# Patient Record
Sex: FEMALE | Race: WHITE | Employment: FULL TIME | URBAN - METROPOLITAN AREA
[De-identification: names, ages, dates, MRNs, and addresses within clinical notes are randomized per-mention and may not be internally consistent; named-entity substitution may affect disease eponyms.]

---

## 2017-09-25 PROBLEM — K59.01 SLOW TRANSIT CONSTIPATION: Status: ACTIVE | Noted: 2017-09-25

## 2018-04-23 DIAGNOSIS — K59.01 SLOW TRANSIT CONSTIPATION: ICD-10-CM

## 2018-04-23 DIAGNOSIS — R19.5 STRONG ODOR OF STOOLS: ICD-10-CM

## 2019-08-27 ENCOUNTER — HOSPITAL ENCOUNTER (OUTPATIENT)
Age: 54
Discharge: HOME | End: 2019-08-29
Payer: COMMERCIAL

## 2019-08-27 PROCEDURE — 87088 URINE BACTERIA CULTURE: CPT

## 2019-08-29 LAB — URINE CULTURE, ROUTINE: NORMAL

## 2020-06-01 ENCOUNTER — HOSPITAL ENCOUNTER (OUTPATIENT)
Age: 55
Discharge: HOME | End: 2020-06-03
Payer: COMMERCIAL

## 2020-06-01 PROCEDURE — G0123 SCREEN CERV/VAG THIN LAYER: HCPCS

## 2020-06-02 PROCEDURE — 87624 HPV HI-RISK TYP POOLED RSLT: CPT

## 2020-06-06 LAB
HPV SAMPLE: NORMAL
HPV TYPE 16: NOT DETECTED
HPV TYPE 18: NOT DETECTED
HPV, HIGH RISK OTHER: NOT DETECTED
INTERPRETATION: NORMAL
SOURCE: NORMAL

## 2020-09-02 ENCOUNTER — HOSPITAL ENCOUNTER (OUTPATIENT)
Age: 55
Discharge: HOME | End: 2020-09-04
Payer: COMMERCIAL

## 2020-09-02 LAB
AMORPHOUS: ABNORMAL
BACTERIA: ABNORMAL /HPF
BILIRUBIN URINE: NEGATIVE
BLOOD, URINE: NEGATIVE
CLARITY: CLEAR
COLOR: YELLOW
GLUCOSE URINE: NEGATIVE MG/DL
KETONES, URINE: NEGATIVE MG/DL
LEUKOCYTE ESTERASE, URINE: ABNORMAL
NITRITE, URINE: NEGATIVE
PH UA: 7.5 (ref 5–9)
PROTEIN UA: NEGATIVE MG/DL
RBC UA: ABNORMAL /HPF (ref 0–2)
SPECIFIC GRAVITY UA: 1.01 (ref 1–1.03)
UROBILINOGEN, URINE: 0.2 E.U./DL
WBC UA: ABNORMAL /HPF (ref 0–5)

## 2020-09-02 PROCEDURE — 87088 URINE BACTERIA CULTURE: CPT

## 2020-09-02 PROCEDURE — 81001 URINALYSIS AUTO W/SCOPE: CPT

## 2020-09-04 LAB — URINE CULTURE, ROUTINE: NORMAL

## 2021-03-21 ENCOUNTER — IMMUNIZATION (OUTPATIENT)
Dept: PRIMARY CARE CLINIC | Age: 56
End: 2021-03-21
Payer: COMMERCIAL

## 2021-03-21 PROCEDURE — 91300 COVID-19, PFIZER VACCINE 30MCG/0.3ML DOSE: CPT | Performed by: INTERNAL MEDICINE

## 2021-03-21 PROCEDURE — 0001A PR IMM ADMN SARSCOV2 30MCG/0.3ML DIL RECON 1ST DOSE: CPT | Performed by: INTERNAL MEDICINE

## 2021-04-21 ENCOUNTER — IMMUNIZATION (OUTPATIENT)
Dept: PRIMARY CARE CLINIC | Age: 56
End: 2021-04-21
Payer: COMMERCIAL

## 2021-04-21 PROCEDURE — 0002A COVID-19, PFIZER VACCINE 30MCG/0.3ML DOSE: CPT | Performed by: NURSE PRACTITIONER

## 2021-04-21 PROCEDURE — 91300 COVID-19, PFIZER VACCINE 30MCG/0.3ML DOSE: CPT | Performed by: NURSE PRACTITIONER

## 2023-02-16 ENCOUNTER — OFFICE VISIT (OUTPATIENT)
Dept: ENDOCRINOLOGY | Age: 58
End: 2023-02-16
Payer: COMMERCIAL

## 2023-02-16 VITALS
DIASTOLIC BLOOD PRESSURE: 90 MMHG | HEART RATE: 79 BPM | SYSTOLIC BLOOD PRESSURE: 157 MMHG | WEIGHT: 135 LBS | OXYGEN SATURATION: 99 % | BODY MASS INDEX: 24.84 KG/M2 | HEIGHT: 62 IN

## 2023-02-16 DIAGNOSIS — E04.9 GOITER: Primary | ICD-10-CM

## 2023-02-16 PROCEDURE — 99204 OFFICE O/P NEW MOD 45 MIN: CPT | Performed by: CLINICAL NURSE SPECIALIST

## 2023-02-16 PROCEDURE — G8427 DOCREV CUR MEDS BY ELIG CLIN: HCPCS | Performed by: CLINICAL NURSE SPECIALIST

## 2023-02-16 PROCEDURE — G8420 CALC BMI NORM PARAMETERS: HCPCS | Performed by: CLINICAL NURSE SPECIALIST

## 2023-02-16 PROCEDURE — 3017F COLORECTAL CA SCREEN DOC REV: CPT | Performed by: CLINICAL NURSE SPECIALIST

## 2023-02-16 PROCEDURE — 4004F PT TOBACCO SCREEN RCVD TLK: CPT | Performed by: CLINICAL NURSE SPECIALIST

## 2023-02-16 PROCEDURE — G8484 FLU IMMUNIZE NO ADMIN: HCPCS | Performed by: CLINICAL NURSE SPECIALIST

## 2023-02-16 NOTE — PROGRESS NOTES
[address] [place] [address] Department of Endocrinology Diabetes and Metabolism   [address] [facility] [address]   Phone: [phone]  Fax: [phone]    Date of Service: 2/16/2023    Primary Care Physician: Judd Gamino DO  Referring physician: [place]*  Provider: Anitha Merchant APRN - CNS     Reason for the visit:  Goiter      History of Present Illness: The history is provided by the patient. No  was used. Accuracy of the patient data is excellent. Daniel Bertrand is a very pleasant 62 y.o. female seen today for goiter    First diagnosed: 9/2022  Context: Pt was having weight gain, fatigue and enlarged thyroid on palpiation  Symptoms: Decreased muscle tone   Imaging: (10/22) Thyroid ultrasound showed right lobe measuring 4.4 x 1.0 x 1.6 cm and left lobe measuring 3.8 x 1.0 x 1.7 cm, isthmus unremarkable. No nodules identified  Labs: 9/2022 TSH 2.1, T4 5.5  Denies compressive symptoms   Mother had hypothyroidism      PAST MEDICAL HISTORY   Past Medical History:   Diagnosis Date    Anxiety     Chronic cystitis     Headache     Urinary incontinence     over active bladder       PAST SURGICAL HISTORY   Past Surgical History:   Procedure Laterality Date    COLONOSCOPY  2005    [place]    COLONOSCOPY  3/21/16    Dr. Clarice Gilford LEEP      Precancerous cells    RHINOPLASTY         SOCIAL HISTORY   Tobacco:   reports that she has never smoked. She does not have any smokeless tobacco history on file. Alcohol:   reports current alcohol use of about 1.0 standard drink per week. Drugs:   reports no history of drug use.     FAMILY HISTORY   Family History   Problem Relation Age of Onset    Diabetes Mother     Hypertension Mother     Migraines Mother     Stroke Mother     Thyroid Disease Mother         Hypothyroid    Cancer Mother 79        Breast    Migraines Maternal Grandmother     Migraines Daughter        ALLERGIES AND DRUG REACTIONS   Allergies Allergen Reactions    Ciprofloxacin Diarrhea and Palpitations    Diflucan [Fluconazole] Rash       CURRENT MEDICATIONS   Current Outpatient Medications   Medication Sig Dispense Refill    ibuprofen (ADVIL;MOTRIN) 800 MG tablet TAKE 1 TABLET BY MOUTH EVERY 8 HOURS AS NEEDED FOR PAIN  5    amitriptyline (ELAVIL) 25 MG tablet Take 25 mg by mouth nightly      SUMAtriptan (IMITREX) 100 MG tablet Take 100 mg by mouth once as needed for Migraine      senna-docusate (PERICOLACE) 8.6-50 MG per tablet 2 tabs po daily x 2 days 4 tablet 0    AMERGE 2.5 MG tablet TAKE 1 TABLET BY MOUTH ONCE FOR MIGRAINE HEADACHE MAY REPEAT ONCE IN 4 HOURS IF NEEDED 9 tablet 4    Multiple Vitamins-Minerals (MULTIVITAMIN PO) Take by mouth      Omega-3 Fatty Acids (OMEGA 3 PO) Take by mouth      Nutritional Supplements (VITAMIN D BOOSTER PO) Take by mouth       No current facility-administered medications for this visit. Review of Systems  Constitutional: No fever, no chills, no diaphoresis, no generalized weakness. HEENT: No blurred vision, No sore throat, no ear pain, no hair loss  Neck: denied any neck swelling, difficulty swallowing,   Cardio-pulmonary: No CP, SOB or palpitation, No orthopnea or PND. No cough or wheezing. GI: No N/V/D, no constipation, No abdominal pain, no melena or hematochezia   : Denied any dysuria, hematuria, flank pain, discharge, or incontinence. Skin: denied any rash, ulcer, Hirsute, or hyperpigmentation. MSK: denied any joint deformity, joint pain/swelling, muscle pain, or back pain.   Neuro: no numbness, no tingling, no weakness, _    OBJECTIVE    BP (!) 157/90   Pulse 79   Ht 5' 2\" (1.575 m)   Wt 135 lb (61.2 kg)   SpO2 99%   BMI 24.69 kg/m²   BP Readings from Last 4 Encounters:   02/16/23 (!) 157/90   10/03/17 138/88   01/31/17 (!) 164/86   01/03/17 (!) 152/88     Wt Readings from Last 6 Encounters:   02/16/23 135 lb (61.2 kg)   10/03/17 131 lb 6.4 oz (59.6 kg)   01/31/17 134 lb (60.8 kg) 01/03/17 132 lb (59.9 kg)   03/21/16 130 lb (59 kg)   02/25/16 132 lb (59.9 kg)       Physical examination:  General: awake alert, oriented x3, no abnormal position or movements. HEENT: normocephalic non-traumatic, no exophthalmos   Neck: supple, no LN enlargement, no thyromegaly, no thyroid tenderness, no JVD. Pulm: Clear equal air entry no added sounds, no wheezing or rhonchi    CVS: S1 + S2, no murmur, no heave. Dorsalis pedis pulse palpable   Abd: soft lax, no tenderness, no organomegaly, audible bowel sounds. Skin: warm, no lesions, no rash.    Musculoskeletal: No back tenderness, no kyphosis/scoliosis    Neuro: CN intact, muscle power normal  Psych: normal mood, and affect      Review of Laboratory Data:  I personally reviewed the following lab:  Lab Results   Component Value Date/Time    WBC 5.9 01/13/2016 08:04 AM    RBC 4.48 01/13/2016 08:04 AM    HGB 14.0 01/13/2016 08:04 AM    HCT 42.0 01/13/2016 08:04 AM    MCV 93.7 01/13/2016 08:04 AM    MCH 31.1 01/13/2016 08:04 AM    MCHC 33.2 01/13/2016 08:04 AM    RDW 11.6 01/13/2016 08:04 AM     01/13/2016 08:04 AM    MPV 7.7 01/13/2016 08:04 AM      Lab Results   Component Value Date/Time     01/13/2016 08:04 AM    K 4.2 01/13/2016 08:04 AM    CO2 24 01/13/2016 08:04 AM    BUN 10 01/13/2016 08:04 AM    CREATININE 0.6 01/13/2016 08:04 AM    CALCIUM 9.3 01/13/2016 08:04 AM    LABGLOM >60 01/13/2016 08:04 AM    GFRAA >60 01/13/2016 08:04 AM      Lab Results   Component Value Date/Time    TSH 1.550 01/13/2016 08:04 AM     Lab Results   Component Value Date/Time    GLUCOSE 96 01/13/2016 08:04 AM     No results found for: LABA1C  Lab Results   Component Value Date/Time    TRIG 88 01/13/2016 08:04 AM    HDL 50 01/13/2016 08:04 AM    LDLCALC 92 01/13/2016 08:04 AM    CHOL 160 01/13/2016 08:04 AM     No results found for: [address], a 62 y.o.-old female seen in for the following issues       Assessment: Diagnosis Orders   1. Goiter  T4, Free    TSH    THYROID PEROXIDASE ANTIBODY          Plan:     1. Goiter   I have reviewed thyroid ultrasound  Mild goiter with no nodules  No concerning features  No compressive symptoms  Last TSH and total T4 within normal limits  Repeat TFT and TPO antibody  Further recommendations will be made after results are obtained  If thyroid function testing is normal will see patient as needed         I personally spent greater than 45 minutes reviewing external notes from PCP and other patient's care team providers, and personally interpreted labs associated with the above diagnosis. I also ordered labs to further assess and manage the above addressed medical conditions. Return if symptoms worsen or fail to improve. The above issues were reviewed with the patient who understood and agreed with the plan. Thank you for allowing us to participate in the care of this patient. Please do not hesitate to contact us with any additional questions. MELISSA Davalos     [facility] Diabetes Care and Endocrinology   [address] [place] [id number]   Phone: [phone]  Fax: [phone]  --------------------------------------------  An electronic signature was used to authenticate this note.  MELISSA Davalos on 2/16/2023 at 1:32 PM

## 2023-02-17 LAB
T4 FREE: 0.6
TSH SERPL DL<=0.05 MIU/L-ACNC: 2.16 UIU/ML

## 2023-02-22 ENCOUNTER — TELEPHONE (OUTPATIENT)
Dept: ENDOCRINOLOGY | Age: 58
End: 2023-02-22

## 2023-02-22 DIAGNOSIS — E04.9 GOITER: ICD-10-CM

## 2023-02-22 DIAGNOSIS — E04.9 GOITER: Primary | ICD-10-CM

## 2023-02-22 NOTE — TELEPHONE ENCOUNTER
Please call patient and inform her TSH is normal.  Free T4 is mildly low. Please see if lab can add on T4 with direct dialysis.   Miscellaneous test ordered

## 2023-02-22 NOTE — TELEPHONE ENCOUNTER
Called, spoke to patient and gave results and recommendations/instructions  Pt verbalized understanding  New labs added

## 2023-02-28 ENCOUNTER — TELEPHONE (OUTPATIENT)
Dept: ENDOCRINOLOGY | Age: 58
End: 2023-02-28

## 2023-02-28 DIAGNOSIS — E04.9 GOITER: ICD-10-CM

## 2023-02-28 NOTE — TELEPHONE ENCOUNTER
----- Message from MELISSA Harper sent at 2/28/2023  2:31 PM EST -----  Please call patient and inform her T4 with direct dialysis is normal.  This is an excellent result. TPO antibody was negative. No need for thyroid hormone treatment.   We will continue to observe

## 2023-03-02 ENCOUNTER — TELEPHONE (OUTPATIENT)
Dept: ENDOCRINOLOGY | Age: 58
End: 2023-03-02

## 2023-03-02 DIAGNOSIS — E04.9 GOITER: Primary | ICD-10-CM

## 2023-03-02 NOTE — TELEPHONE ENCOUNTER
The pt was concerned because some of her lab results are borderline. She has a lot of hypothyroid symptoms. She's tired and has had weight gain and heat/cold intolerance. She wasn't sure if you want to check her levels again in the near future or start her on a low dose of medication. Please advise.

## 2023-04-13 LAB
T4 TOTAL: 4.8
TSH SERPL DL<=0.05 MIU/L-ACNC: 2.11 UIU/ML

## 2023-04-19 ENCOUNTER — TELEPHONE (OUTPATIENT)
Dept: ENDOCRINOLOGY | Age: 58
End: 2023-04-19

## 2023-04-19 DIAGNOSIS — E04.9 GOITER: ICD-10-CM

## 2023-04-19 NOTE — TELEPHONE ENCOUNTER
----- Message from MELISSA Tavera sent at 4/19/2023 11:00 AM EDT -----  Please call patient and inform her thyroid function is normal.  This is an excellent result.   No need for thyroid hormone replacement

## 2023-09-12 ENCOUNTER — OFFICE VISIT (OUTPATIENT)
Dept: CARDIOLOGY CLINIC | Age: 58
End: 2023-09-12
Payer: COMMERCIAL

## 2023-09-12 VITALS
WEIGHT: 136.2 LBS | SYSTOLIC BLOOD PRESSURE: 122 MMHG | RESPIRATION RATE: 16 BRPM | DIASTOLIC BLOOD PRESSURE: 60 MMHG | HEIGHT: 62 IN | BODY MASS INDEX: 25.06 KG/M2 | HEART RATE: 76 BPM | OXYGEN SATURATION: 99 %

## 2023-09-12 DIAGNOSIS — Z01.818 PRE-OP EVALUATION: ICD-10-CM

## 2023-09-12 DIAGNOSIS — N30.20 CHRONIC CYSTITIS: Primary | ICD-10-CM

## 2023-09-12 DIAGNOSIS — Z01.810 PREOP CARDIOVASCULAR EXAM: Primary | ICD-10-CM

## 2023-09-12 PROCEDURE — G8420 CALC BMI NORM PARAMETERS: HCPCS | Performed by: INTERNAL MEDICINE

## 2023-09-12 PROCEDURE — 99203 OFFICE O/P NEW LOW 30 MIN: CPT | Performed by: INTERNAL MEDICINE

## 2023-09-12 PROCEDURE — G8427 DOCREV CUR MEDS BY ELIG CLIN: HCPCS | Performed by: INTERNAL MEDICINE

## 2023-09-12 PROCEDURE — 93000 ELECTROCARDIOGRAM COMPLETE: CPT | Performed by: INTERNAL MEDICINE

## 2023-09-12 PROCEDURE — 1036F TOBACCO NON-USER: CPT | Performed by: INTERNAL MEDICINE

## 2023-09-12 PROCEDURE — 3017F COLORECTAL CA SCREEN DOC REV: CPT | Performed by: INTERNAL MEDICINE

## 2023-09-12 RX ORDER — SOLIFENACIN SUCCINATE 5 MG/1
10 TABLET, FILM COATED ORAL
COMMUNITY

## 2023-09-12 RX ORDER — LOSARTAN POTASSIUM 25 MG/1
25 TABLET ORAL DAILY
COMMUNITY
Start: 2023-07-05

## 2023-09-12 RX ORDER — BUSPIRONE HYDROCHLORIDE 7.5 MG/1
7.5 TABLET ORAL 2 TIMES DAILY
COMMUNITY
Start: 2023-08-13

## 2023-09-12 ASSESSMENT — ENCOUNTER SYMPTOMS
NAUSEA: 0
BLOOD IN STOOL: 0
DIARRHEA: 0
WHEEZING: 0
CONSTIPATION: 0
VOMITING: 0
COUGH: 0
SHORTNESS OF BREATH: 0
ABDOMINAL PAIN: 0
BACK PAIN: 0

## 2023-09-12 NOTE — PROGRESS NOTES
OUTPATIENT CARDIOLOGY CONSULT    Name: Roxie Jeffrey    Age: 62 y.o. Date of Service: 9/12/2023    Reason for Consultation:   Chief Complaint   Patient presents with    Cardiac Clearance     Consult, per Dr Aditya Roberts, to obtain cardiac clearance for right shoulder surgery with Dr Tracie Nuñez. Referring Physician: Dameon Temple DO    History of Present Illness:  68-year-old non-smoker female who is referred for cardiac clearance prior to right rotator cuff shoulder surgery. She has history of hypertension, Goiter, headache, overreactive bladder, chronic cystitis and anxiety. Patient is active. She can go up 13 steps and she has been cutting her grass with no chest discomfort or dyspnea on exertion. He feels occasional palpitations. She denies dizziness or syncope. She denies lower extremity edema. She eats dark chocolate and drinks 1 cup and a half of coffee a day. She has no family history for premature CAD. EKG done today revealed sinus rhythm at 76 bpm, left atrial enlargement and incomplete right bundle branch block. Review of Systems:  Review of Systems   Constitutional:  Negative for chills, fatigue and fever. HENT:  Negative for nosebleeds. Respiratory:  Negative for cough, shortness of breath and wheezing. Snores at night but denies gasping for air. Gastrointestinal:  Negative for abdominal pain, blood in stool, constipation, diarrhea, nausea and vomiting. GERD. Genitourinary:  Negative for dysuria and hematuria. Musculoskeletal:  Negative for back pain, joint swelling and myalgias. Right shoulder pain. Neurological:  Negative for syncope and light-headedness. Psychiatric/Behavioral:  The patient is not nervous/anxious.            Past Medical History:  Past Medical History:   Diagnosis Date    Anxiety     Chronic cystitis     Headache     Urinary incontinence     over active bladder       Past Surgical History:  Past Surgical